# Patient Record
Sex: FEMALE | Race: BLACK OR AFRICAN AMERICAN | NOT HISPANIC OR LATINO | URBAN - METROPOLITAN AREA
[De-identification: names, ages, dates, MRNs, and addresses within clinical notes are randomized per-mention and may not be internally consistent; named-entity substitution may affect disease eponyms.]

---

## 2017-12-01 ENCOUNTER — OFFICE VISIT - HEALTHEAST (OUTPATIENT)
Dept: FAMILY MEDICINE | Facility: CLINIC | Age: 22
End: 2017-12-01

## 2017-12-01 DIAGNOSIS — Z00.00 ROUTINE GENERAL MEDICAL EXAMINATION AT A HEALTH CARE FACILITY: ICD-10-CM

## 2017-12-01 DIAGNOSIS — R63.4 WEIGHT LOSS: ICD-10-CM

## 2017-12-01 ASSESSMENT — MIFFLIN-ST. JEOR: SCORE: 1300.5

## 2017-12-03 ENCOUNTER — COMMUNICATION - HEALTHEAST (OUTPATIENT)
Dept: FAMILY MEDICINE | Facility: CLINIC | Age: 22
End: 2017-12-03

## 2018-03-30 ENCOUNTER — COMMUNICATION - HEALTHEAST (OUTPATIENT)
Dept: FAMILY MEDICINE | Facility: CLINIC | Age: 23
End: 2018-03-30

## 2018-05-16 ENCOUNTER — COMMUNICATION - HEALTHEAST (OUTPATIENT)
Dept: FAMILY MEDICINE | Facility: CLINIC | Age: 23
End: 2018-05-16

## 2018-07-27 ENCOUNTER — OFFICE VISIT - HEALTHEAST (OUTPATIENT)
Dept: FAMILY MEDICINE | Facility: CLINIC | Age: 23
End: 2018-07-27

## 2018-07-27 DIAGNOSIS — L81.9 HYPERPIGMENTATION: ICD-10-CM

## 2018-07-27 DIAGNOSIS — B35.4 TINEA CORPORIS: ICD-10-CM

## 2018-07-27 RX ORDER — TRIAMCINOLONE ACETONIDE 1 MG/G
CREAM TOPICAL 2 TIMES DAILY
Qty: 80 G | Refills: 1 | Status: SHIPPED | OUTPATIENT
Start: 2018-07-27 | End: 2021-07-16

## 2018-07-27 RX ORDER — KETOCONAZOLE 20 MG/G
CREAM TOPICAL 2 TIMES DAILY
Qty: 60 G | Refills: 0 | Status: SHIPPED | OUTPATIENT
Start: 2018-07-27 | End: 2021-07-16

## 2018-07-27 NOTE — ASSESSMENT & PLAN NOTE
This is persistent since November of last year.  Most notable on the left anterior thigh.  Ketoconazole as ordered.  Follow-up in 1 month if no improvement noted.

## 2018-07-27 NOTE — ASSESSMENT & PLAN NOTE
Noted to be Pickering 5 and questionable hyperpigmentation secondary to tinea.  Topical as ordered triamcinolone.

## 2021-05-31 VITALS — WEIGHT: 119 LBS | BODY MASS INDEX: 19.13 KG/M2 | HEIGHT: 66 IN

## 2021-06-01 VITALS — BODY MASS INDEX: 17.62 KG/M2 | WEIGHT: 110 LBS

## 2021-06-14 NOTE — PROGRESS NOTES
" Patient ID: Robina Contreras is a 22 y.o. female.  /62  Pulse 63  Ht 5' 6.25\" (1.683 m)  Wt 119 lb (54 kg)  SpO2 100%  BMI 19.06 kg/m2    Assessment/Plan:                   Diagnoses and all orders for this visit:    Routine general medical examination at a health care facility    Weight loss    Other orders  -     HPV vaccine quadrivalent 3 dose IM  -     Meningococcal MCV4P  -     Cancel: Influenza, Seasonal,Quad Inj, 36+ MOS  -     Td, Preservative Free (green label)      DISCUSSION  Immunizations.  Declines flu shot.  Check labs as above.  Subjective:     HPI    Robina Contreras is a healthy 22-year-old female.  Her biggest concern is that she wants to be able to gain weight.  She has been thin all of her life.  She is trying to exercise by lifting weights and build muscle mass.  She eats a healthy balanced diet.  Tries to even get in some extra calories.  Recommend staying away from weight gain is but continue to strive for a healthy balanced diet and remain active especially doing more muscle building.  Discussed evaluation to rule out any health concerns that may contribute to weight loss.  Discussed immunizations.  Reviewed other health concerns.  She had been seen and diagnosed with ringworm recently the rash seems to be resolving.  Otherwise is overall doing well reviewed social and family history.  She is set to graduate from [employer].  She plans to go into graduate school in sports psychology.  Will probably work as some type of  in the meantime.    Today we discussed in detail the process of obtaining a Pap smear.  She does not want to have this done today but will schedule an appointment.  Discussed options for getting this done.  Low risk.    Review of Systems  Complete review of systems is obtained.  Other than the specific considerations noted above complete review of systems is negative.      Objective:   Medications:  Current Outpatient Prescriptions " "  Medication Sig     triamcinolone (KENALOG) 0.1 % cream Apply topically 2 (two) times a day.     Allergies:  No Known Allergies    Tobacco:   reports that she has never smoked. She does not have any smokeless tobacco history on file.    HEALTH PREVENTION    General  Dental care: Discussed the importance of regular dental care.  Eye care: Discussed importance of routine eye exams for glaucoma screening  Exercise: Discussed importance of maintaining an exercise regimen.  Diet: Just a healthy balanced diet.    Wt Readings from Last 3 Encounters:   12/01/17 119 lb (54 kg)   01/18/16 119 lb 12.8 oz (54.3 kg)     Body mass index is 19.06 kg/(m^2).    Cancer screening  Testicular cancer:is discussed and exam performed today  Skin cancer: Discussed sun burn prevention and self monitoring.  Breast Cancer: Not indicated at this time.  Cervical Cancer: See above    Cholesterol:   YNo results found for: LDLCALC   Blood Pressure:   BP Readings from Last 3 Encounters:   12/01/17 106/62   01/18/16 106/66     Immunization History   Administered Date(s) Administered     Dtap 1995, 1995, 1995, 05/13/1996, 01/28/2000     HPV 9 Valent 01/18/2016     Hep B, Peds or Adolescent 1995, 1995, 1995     Hib (PRP-OMP) 1995, 1995, 1995, 05/03/1996     IPV 1995, 1995, 05/13/1996     Influenza, Live, Nasal LAIV3 10/16/2010     Influenza, Seasonal, Inj PF IIV3 02/09/2010     Influenza, seasonal,quad inj 6-35 mos 11/23/2012     Influenza,seasonal, Inj IIV3 12/29/2006, 11/23/2007, 11/23/2012     MMR 05/13/1996, 01/28/2000     Meningococcal MCV4P 05/04/2009     OPV,Trivalent,Historic 01/28/2000     Tdap 01/09/2007         Topic Date Due     HPV VACCINES (2 of 3 - Female 3 Dose Series) 03/14/2016      Physical Exam      /62  Pulse 63  Ht 5' 6.25\" (1.683 m)  Wt 119 lb (54 kg)  SpO2 100%  BMI 19.06 kg/m2    General Appearance:    Alert, cooperative, no distress, appears stated " age   Head:    Normocephalic, without obvious abnormality, atraumatic   Eyes:    PERRL, conjunctiva/corneas clear, EOM's intact       Ears:    Normal TM's and external ear canals, both ears   Nose:   Nares normal, septum midline, mucosa normal, no drainage    or sinus tenderness   Throat:   Lips, mucosa, and tongue normal; teeth and gums normal   Neck:   Supple, symmetrical, trachea midline, no adenopathy;        thyroid:  No enlargement/tenderness/nodules   Lungs:     Clear to auscultation bilaterally, respirations unlabored   Heart:    Regular rate and rhythm, S1 and S2 normal, no murmur, rub   or gallop   Abdomen:     Soft, non-tender, bowel sounds active all four quadrants,     no masses, no organomegaly   Extremities:   Extremities normal, atraumatic, no cyanosis or edema   Skin:   Skin color, texture, turgor normal, no rashes or lesions   Neurologic:   CNII-XII intact. Normal strength, sensation and reflexes       throughout

## 2021-06-16 PROBLEM — B35.4 TINEA CORPORIS: Status: ACTIVE | Noted: 2018-07-27

## 2021-06-16 PROBLEM — L81.9 HYPERPIGMENTATION: Status: ACTIVE | Noted: 2018-07-27

## 2021-06-26 ENCOUNTER — HEALTH MAINTENANCE LETTER (OUTPATIENT)
Age: 26
End: 2021-06-26

## 2021-07-15 ENCOUNTER — MYC MEDICAL ADVICE (OUTPATIENT)
Dept: FAMILY MEDICINE | Facility: CLINIC | Age: 26
End: 2021-07-15

## 2021-07-15 DIAGNOSIS — L81.9 HYPERPIGMENTATION: Primary | ICD-10-CM

## 2021-07-16 RX ORDER — KETOCONAZOLE 20 MG/G
CREAM TOPICAL 2 TIMES DAILY
Qty: 60 G | Refills: 0 | Status: SHIPPED | OUTPATIENT
Start: 2021-07-16

## 2021-07-16 RX ORDER — TRIAMCINOLONE ACETONIDE 1 MG/G
CREAM TOPICAL 2 TIMES DAILY
Qty: 80 G | Refills: 1 | Status: SHIPPED | OUTPATIENT
Start: 2021-07-16

## 2021-10-16 ENCOUNTER — HEALTH MAINTENANCE LETTER (OUTPATIENT)
Age: 26
End: 2021-10-16

## 2021-10-27 ENCOUNTER — MYC MEDICAL ADVICE (OUTPATIENT)
Dept: FAMILY MEDICINE | Facility: CLINIC | Age: 26
End: 2021-10-27

## 2021-10-28 ENCOUNTER — MYC MEDICAL ADVICE (OUTPATIENT)
Dept: FAMILY MEDICINE | Facility: CLINIC | Age: 26
End: 2021-10-28

## 2021-10-31 ENCOUNTER — MYC MEDICAL ADVICE (OUTPATIENT)
Dept: FAMILY MEDICINE | Facility: CLINIC | Age: 26
End: 2021-10-31

## 2022-09-25 ENCOUNTER — HEALTH MAINTENANCE LETTER (OUTPATIENT)
Age: 27
End: 2022-09-25

## 2023-10-14 ENCOUNTER — HEALTH MAINTENANCE LETTER (OUTPATIENT)
Age: 28
End: 2023-10-14